# Patient Record
Sex: FEMALE | Race: WHITE | Employment: OTHER | URBAN - METROPOLITAN AREA
[De-identification: names, ages, dates, MRNs, and addresses within clinical notes are randomized per-mention and may not be internally consistent; named-entity substitution may affect disease eponyms.]

---

## 2019-07-27 ENCOUNTER — HOSPITAL ENCOUNTER (OUTPATIENT)
Age: 79
Discharge: HOME | End: 2019-07-27
Attending: EMERGENCY MEDICINE
Payer: MEDICARE

## 2019-07-27 VITALS
BODY MASS INDEX: 27.31 KG/M2 | DIASTOLIC BLOOD PRESSURE: 80 MMHG | WEIGHT: 160 LBS | RESPIRATION RATE: 16 BRPM | SYSTOLIC BLOOD PRESSURE: 138 MMHG | OXYGEN SATURATION: 97 % | HEART RATE: 83 BPM | TEMPERATURE: 98 F | HEIGHT: 64 IN

## 2019-07-27 DIAGNOSIS — S81.802A OPEN WOUND OF LEFT LOWER EXTREMITY, INITIAL ENCOUNTER: Primary | ICD-10-CM

## 2019-07-27 PROCEDURE — 99202 OFFICE O/P NEW SF 15 MIN: CPT

## 2019-07-27 NOTE — ED PROVIDER NOTES
Patient Seen in: [address]    History   Patient presents with:  Cellulitis (integumentary, infectious)    Stated Complaint: left leg problem    HPI  Patient presents as she is concerned about slow healing to a left leg HENT:   Head: Normocephalic and atraumatic. Right Ear: External ear normal.   Left Ear: External ear normal.   Eyes: Conjunctivae and EOM are normal.   Neck: Normal range of motion. Neck supple.    Cardiovascular: Normal rate, regular rhythm, normal heart [address]  [phone]  Call in 2 days          Medications Prescribed:  Discharge Medication List as of 7/27/2019 12:39 PM
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
## 2019-07-27 NOTE — ED NOTES
Wound Care information given to patient for [facility] and [facility].  Patient verbalized understanding to f/u with Dr. Giovanna Julio and with wound care.

## 2023-07-23 ENCOUNTER — HOSPITAL ENCOUNTER (OUTPATIENT)
Age: 83
Discharge: HOME | End: 2023-07-23
Payer: MEDICARE

## 2023-07-23 VITALS
RESPIRATION RATE: 18 BRPM | DIASTOLIC BLOOD PRESSURE: 88 MMHG | TEMPERATURE: 99 F | HEART RATE: 85 BPM | OXYGEN SATURATION: 97 % | SYSTOLIC BLOOD PRESSURE: 137 MMHG

## 2023-07-23 DIAGNOSIS — R31.9 URINARY TRACT INFECTION WITH HEMATURIA, SITE UNSPECIFIED: Primary | ICD-10-CM

## 2023-07-23 DIAGNOSIS — N39.0 URINARY TRACT INFECTION WITH HEMATURIA, SITE UNSPECIFIED: Primary | ICD-10-CM

## 2023-07-23 LAB
BILIRUB UR QL STRIP: NEGATIVE
COLOR UR: YELLOW
GLUCOSE UR STRIP-MCNC: NEGATIVE MG/DL
KETONES UR STRIP-MCNC: NEGATIVE MG/DL
NITRITE UR QL STRIP: NEGATIVE
PH UR STRIP: 6 [PH]
PROT UR STRIP-MCNC: NEGATIVE MG/DL
SP GR UR STRIP: 1.01
UROBILINOGEN UR STRIP-ACNC: <2 MG/DL

## 2023-07-23 PROCEDURE — 99203 OFFICE O/P NEW LOW 30 MIN: CPT | Performed by: NURSE PRACTITIONER

## 2023-07-23 PROCEDURE — 81002 URINALYSIS NONAUTO W/O SCOPE: CPT | Performed by: NURSE PRACTITIONER

## 2023-07-23 RX ORDER — CEPHALEXIN 500 MG/1
500 CAPSULE ORAL 2 TIMES DAILY
Qty: 14 CAPSULE | Refills: 0 | Status: SHIPPED | OUTPATIENT
Start: 2023-07-23 | End: 2023-07-30

## 2023-07-23 NOTE — DISCHARGE INSTRUCTIONS
Start the antibiotic as prescribed. A urine culture is being sent out if it grows a bacteria showing resistance to the antibiotic prescribed you will be notified and the antibiotic will be changed. Drink plenty of fluids and urinate whenever you have the urge if you develop abdominal pain severe back pain fever nausea or vomiting or any new or worsening symptoms go to the nearest emergency department.

## 2023-08-04 ENCOUNTER — HOSPITAL ENCOUNTER (OUTPATIENT)
Age: 83
Discharge: HOME | End: 2023-08-04
Payer: MEDICARE

## 2023-08-04 VITALS
DIASTOLIC BLOOD PRESSURE: 90 MMHG | RESPIRATION RATE: 16 BRPM | SYSTOLIC BLOOD PRESSURE: 149 MMHG | HEART RATE: 89 BPM | OXYGEN SATURATION: 97 % | TEMPERATURE: 98 F

## 2023-08-04 DIAGNOSIS — N39.0 RECURRENT UTI (URINARY TRACT INFECTION): Primary | ICD-10-CM

## 2023-08-04 DIAGNOSIS — R39.9 UTI SYMPTOMS: ICD-10-CM

## 2023-08-04 LAB
CLARITY UR: CLEAR
COLOR UR: YELLOW
GLUCOSE BLDC GLUCOMTR-MCNC: 101 MG/DL (ref 70–99)
GLUCOSE UR STRIP-MCNC: NEGATIVE MG/DL
NITRITE UR QL STRIP: NEGATIVE
PH UR STRIP: 5.5 [PH]
PROT UR STRIP-MCNC: 30 MG/DL
SP GR UR STRIP: 1.02
UROBILINOGEN UR STRIP-ACNC: <2 MG/DL

## 2023-08-04 PROCEDURE — 81002 URINALYSIS NONAUTO W/O SCOPE: CPT | Performed by: PHYSICIAN ASSISTANT

## 2023-08-04 PROCEDURE — 99214 OFFICE O/P EST MOD 30 MIN: CPT | Performed by: PHYSICIAN ASSISTANT

## 2023-08-04 PROCEDURE — 82962 GLUCOSE BLOOD TEST: CPT | Performed by: PHYSICIAN ASSISTANT

## 2023-08-04 RX ORDER — AMOXICILLIN 500 MG/1
500 CAPSULE ORAL 2 TIMES DAILY
Qty: 20 CAPSULE | Refills: 0 | Status: SHIPPED | OUTPATIENT
Start: 2023-08-04 | End: 2023-08-14

## 2023-08-04 NOTE — ED INITIAL ASSESSMENT (HPI)
Patient is here with burning on urination and frequency ever since she was diagnosed with a urinary tract infection.

## 2023-08-07 NOTE — ED QUICK NOTES
Patient called and said she was still having symptoms.   Anna Marie hernandez APN reviewed the chart and the patient was instructed to finish the antibiotic and if still having symptoms to follow up with her MD.

## 2024-08-01 ENCOUNTER — HOSPITAL ENCOUNTER (OUTPATIENT)
Age: 84
Discharge: HOME | End: 2024-08-01
Payer: MEDICARE

## 2024-08-01 VITALS
HEART RATE: 89 BPM | TEMPERATURE: 98 F | SYSTOLIC BLOOD PRESSURE: 132 MMHG | OXYGEN SATURATION: 97 % | RESPIRATION RATE: 18 BRPM | DIASTOLIC BLOOD PRESSURE: 82 MMHG

## 2024-08-01 DIAGNOSIS — R30.0 DYSURIA: ICD-10-CM

## 2024-08-01 DIAGNOSIS — N30.01 ACUTE CYSTITIS WITH HEMATURIA: Primary | ICD-10-CM

## 2024-08-01 LAB
COLOR UR: YELLOW
GLUCOSE UR STRIP-MCNC: NEGATIVE MG/DL
NITRITE UR QL STRIP: NEGATIVE
PH UR STRIP: 6 [PH]
PROT UR STRIP-MCNC: 30 MG/DL
SP GR UR STRIP: 1.02
UROBILINOGEN UR STRIP-ACNC: 2 MG/DL

## 2024-08-01 PROCEDURE — 99214 OFFICE O/P EST MOD 30 MIN: CPT | Performed by: NURSE PRACTITIONER

## 2024-08-01 PROCEDURE — 81002 URINALYSIS NONAUTO W/O SCOPE: CPT | Performed by: NURSE PRACTITIONER

## 2024-08-01 RX ORDER — CEFADROXIL 500 MG/1
500 CAPSULE ORAL 2 TIMES DAILY
Qty: 14 CAPSULE | Refills: 0 | Status: SHIPPED | OUTPATIENT
Start: 2024-08-01 | End: 2024-08-08

## 2024-08-01 NOTE — ED PROVIDER NOTES
Chief Complaint   Patient presents with    Urinary Symptoms       HPI:     Sherley Bower is a 83 year old female with CHF and HTN who presents with a chief complaint of dysuria, urinary frequency, urinary urgency ongoing for 2 weeks.  No fever.  No flank pain or abdominal pain.    PFSH  PFS asessment screens reviewed and agree.  Nursing note reviewed and I agree with documentation.    No family history on file.  Family history reviewed with patient/caregiver and is not pertinent to presenting problem.  Social History     Socioeconomic History    Marital status:      Spouse name: Not on file    Number of children: Not on file    Years of education: Not on file    Highest education level: Not on file   Occupational History    Not on file   Tobacco Use    Smoking status: Never     Passive exposure: Never    Smokeless tobacco: Never   Substance and Sexual Activity    Alcohol use: Not on file    Drug use: Not on file    Sexual activity: Not on file   Other Topics Concern    Not on file   Social History Narrative    Not on file     Social Determinants of Health     Financial Resource Strain: Not on file   Food Insecurity: Not on file   Transportation Needs: Not on file   Physical Activity: Not on file   Stress: Not on file   Social Connections: Not on file   Housing Stability: Not on file           ROS:   Positive for stated complaint: Dysuria  All other systems reviewed and negative except as noted above.  Constitutional and Vital Signs Reviewed.      Physical Exam:     /82   Pulse 89   Temp 98.1 °F (36.7 °C) (Temporal)   Resp 18   SpO2 97%   GENERAL: well developed, well nourished, well hydrated, no distress  LUNGS: clear to auscultation, no RRW  CARDIO: RRR without murmur  BACK: CVA tenderness: Bilaterally, No  GI: soft, non-tender, without masses or organomegaly  EXTREMITIES: no cyanosis or edema. LO without difficulty  SKIN: good skin turgor, no rashes      MDM/Assessment/Plan:   Orders for this  encounter:    Orders Placed This Encounter    POCT Urinalysis Dipstick    Urine Culture, Routine     Order Specific Question:   Specimen source:     Answer:   Urine, clean catch     Order Specific Question:   Documentation of symptoms of UTI or sepsis:     Answer:   Documentation of symptoms of UTI or sepsis must be corroborated within the EMR     Order Specific Question:   Release to patient     Answer:   Immediate    POCT Urine Dip    cefadroxil 500 MG Oral Cap     Sig: Take 1 capsule (500 mg total) by mouth 2 (two) times daily for 7 days.     Dispense:  14 capsule     Refill:  0       Labs performed this visit:  Recent Results (from the past 10 hour(s))   POCT Urinalysis Dipstick    Collection Time: 08/01/24  5:45 PM   Result Value Ref Range    Urine Color Yellow Yellow    Urine Clarity Cloudy (A) Clear    Specific Gravity, Urine 1.020 1.005 - 1.030    PH, Urine 6.0 5.0 - 8.0    Protein urine 30 (A) Negative mg/dL    Glucose, Urine Negative Negative mg/dL    Ketone, Urine Trace (A) Negative mg/dL    Bilirubin, Urine Small (A) Negative    Blood, Urine Moderate (A) Negative    Nitrite Urine Negative Negative    Urobilinogen urine 2.0 (A) <2.0 mg/dL    Leukocyte esterase urine Moderate (A) Negative       MDM:   Medical Decision Making  Differentials: acute cystitis vs pyelonephritis vs obstructive stone vs other     HPI and exam consistent with acute cystitis.  Patient is afebrile, no CVAT on exam, no colicky flank pain, low suspicion for pyelonephritis versus obstructive stone. Will start cefadroxil.  ER precautions were discussed in detail.  Patient was advised to follow-up with primary care provider if no improvement after antibiotic.  Patient verbalized understanding and agreeable to plan of care.       Amount and/or Complexity of Data Reviewed  Labs: ordered. Decision-making details documented in ED Course.     Details: Urine dip- consistent with infection   Urine culture pending     Risk  Prescription drug  management.        Diagnosis:    ICD-10-CM    1. Acute cystitis with hematuria  N30.01       2. Dysuria  R30.0 Urine Culture, Routine     Urine Culture, Routine          All results reviewed and discussed with patient.  See AVS for detailed discharge instructions for your condition today.    Follow Up with:  No follow-up provider specified.

## 2024-08-01 NOTE — DISCHARGE INSTRUCTIONS
Cefadroxil 1 tablet twice per day for 7 days  Please finish your medication as prescribed  Drink plenty of fluids, water and 100% cranberry juice  Please go to the emergency room if you develop fever, sweats, chills, abdominal pain, pelvic pain or back pain  Please see your primary care provider if no better after antibiotics

## 2024-10-04 ENCOUNTER — HOSPITAL ENCOUNTER (OUTPATIENT)
Age: 84
Discharge: HOME | End: 2024-10-04
Payer: MEDICARE

## 2024-10-04 VITALS
HEART RATE: 89 BPM | OXYGEN SATURATION: 98 % | DIASTOLIC BLOOD PRESSURE: 86 MMHG | RESPIRATION RATE: 16 BRPM | TEMPERATURE: 98 F | SYSTOLIC BLOOD PRESSURE: 149 MMHG

## 2024-10-04 DIAGNOSIS — N39.0 URINARY TRACT INFECTION WITHOUT HEMATURIA, SITE UNSPECIFIED: Primary | ICD-10-CM

## 2024-10-04 LAB
BILIRUB UR QL STRIP: NEGATIVE
GLUCOSE UR STRIP-MCNC: NEGATIVE MG/DL
NITRITE UR QL STRIP: POSITIVE
PH UR STRIP: 6 [PH]
PROT UR STRIP-MCNC: 100 MG/DL
SP GR UR STRIP: 1.02
UROBILINOGEN UR STRIP-ACNC: <2 MG/DL

## 2024-10-04 PROCEDURE — 99214 OFFICE O/P EST MOD 30 MIN: CPT | Performed by: NURSE PRACTITIONER

## 2024-10-04 PROCEDURE — 81002 URINALYSIS NONAUTO W/O SCOPE: CPT | Performed by: NURSE PRACTITIONER

## 2024-10-04 RX ORDER — CEPHALEXIN 500 MG/1
500 CAPSULE ORAL 2 TIMES DAILY
Qty: 20 CAPSULE | Refills: 0 | Status: SHIPPED | OUTPATIENT
Start: 2024-10-04 | End: 2024-10-14

## 2024-10-04 NOTE — ED PROVIDER NOTES
Patient Seen in: [facility]      History     Chief Complaint   Patient presents with    Urinary Symptoms            Stated Complaint: Urinary Symptoms    Subjective:   HPI    84-year-old female with history of frequent UTIs presents today with complaints of dysuria urinary frequency and fatigue.  She states she has had the symptoms on and off for a couple of months.  She has been afebrile.  There is been no hematuria.  She does have a urologist.    Objective:     Past Medical History:    A-fib (HCC)    Diabetes (HCC)    Essential hypertension              History reviewed. No pertinent surgical history.             Social History     Socioeconomic History    Marital status:    Tobacco Use    Smoking status: Never     Passive exposure: Never    Smokeless tobacco: Never   Substance and Sexual Activity    Alcohol use: Yes     Comment: occ              Review of Systems    Positive for stated complaint: Urinary Symptoms  Other systems are as noted in HPI.  Constitutional and vital signs reviewed.      All other systems reviewed and negative except as noted above.    Physical Exam     ED Triage Vitals [10/04/24 1348]   /86   Pulse 89   Resp 16   Temp 97.6 °F (36.4 °C)   Temp src Temporal   SpO2 98 %   O2 Device None (Room air)       Current Vitals:   Vital Signs  BP: 149/86  Pulse: 89  Resp: 16  Temp: 97.6 °F (36.4 °C)  Temp src: Temporal    Oxygen Therapy  SpO2: 98 %  O2 Device: None (Room air)        Physical Exam  Vitals reviewed.   Constitutional:       General: She is not in acute distress.     Appearance: She is not ill-appearing.   HENT:      Nose: Nose normal.      Mouth/Throat:      Mouth: Mucous membranes are moist.   Cardiovascular:      Rate and Rhythm: Normal rate and regular rhythm.   Pulmonary:      Effort: Pulmonary effort is normal.      Breath sounds: Normal breath sounds.   Abdominal:      Palpations: Abdomen is soft.      Tenderness: There is no abdominal tenderness. There  is no right CVA tenderness or left CVA tenderness.   Musculoskeletal:         General: Normal range of motion.   Skin:     General: Skin is warm and dry.   Neurological:      General: No focal deficit present.      Mental Status: She is alert and oriented to person, place, and time.   Psychiatric:         Mood and Affect: Mood normal.         Behavior: Behavior normal.             ED Course     Labs Reviewed   [facility] POCT URINALYSIS DIPSTICK - Abnormal; Notable for the following components:       Result Value    Urine Color Alyse (*)     Urine Clarity Cloudy (*)     Protein urine 100 (*)     Ketone, Urine Trace (*)     Blood, Urine Moderate (*)     Nitrite Urine Positive (*)     Leukocyte esterase urine Large (*)     All other components within normal limits   URINE CULTURE, ROUTINE                   MDM                          Medical Decision Making  84-year-old female presents to immediate care with complaints of dysuria and urinary frequency.  Differential diagnosis includes UTI, pyelonephritis, kidney stone.  Patient has history of frequent UTIs.  She is followed by a urologist.  She states she has had the symptoms on and off for a couple of months.  Urine specimen was collected and shows positive UTI.  Will send for culture.  Results were discussed with patient.  She was given a prescription for Keflex and instructions to follow-up with her urologist.    Amount and/or Complexity of Data Reviewed  Labs: ordered.     Details: POC urine shows large leuks, positive nitrites, moderate blood.      Risk  OTC drugs.  Prescription drug management.        Disposition and Plan     Clinical Impression:  1. Urinary tract infection without hematuria, site unspecified         Disposition:  Discharge  10/4/2024  2:14 pm    Follow-up:  No follow-up provider specified.        Medications Prescribed:  Discharge Medication List as of 10/4/2024  2:14 PM        START taking these medications    Details   cephALEXin 500 MG Oral Cap  Take 1 capsule (500 mg total) by mouth 2 (two) times daily for 10 days., Normal, Disp-20 capsule, R-0                 Supplementary Documentation:

## 2024-10-04 NOTE — ED INITIAL ASSESSMENT (HPI)
Dysuria,  cloudy urine, malodorous urine, , fatigue, urinary frequency. Denies fevers. Sx have been present intermittently past couple of months only while taking hiprex.

## 2025-04-05 ENCOUNTER — APPOINTMENT (OUTPATIENT)
Dept: GENERAL RADIOLOGY | Age: 85
End: 2025-04-05
Payer: MEDICARE

## 2025-04-05 ENCOUNTER — HOSPITAL ENCOUNTER (OUTPATIENT)
Age: 85
Discharge: HOME | End: 2025-04-05
Payer: MEDICARE

## 2025-04-05 VITALS
DIASTOLIC BLOOD PRESSURE: 68 MMHG | OXYGEN SATURATION: 100 % | TEMPERATURE: 98 F | RESPIRATION RATE: 20 BRPM | HEART RATE: 91 BPM | SYSTOLIC BLOOD PRESSURE: 108 MMHG

## 2025-04-05 DIAGNOSIS — M79.643 HAND PAIN: ICD-10-CM

## 2025-04-05 DIAGNOSIS — S63.501A SPRAIN OF RIGHT WRIST, INITIAL ENCOUNTER: Primary | ICD-10-CM

## 2025-04-05 DIAGNOSIS — M25.539 WRIST PAIN: ICD-10-CM

## 2025-04-05 PROCEDURE — 99213 OFFICE O/P EST LOW 20 MIN: CPT

## 2025-04-05 PROCEDURE — L3908 WHO COCK-UP NONMOLDE PRE OTS: HCPCS

## 2025-04-05 PROCEDURE — 73110 X-RAY EXAM OF WRIST: CPT

## 2025-04-05 PROCEDURE — 73130 X-RAY EXAM OF HAND: CPT

## 2025-04-05 NOTE — ED PROVIDER NOTES
Patient Seen in: [facility]      History     Chief Complaint   Patient presents with    Wrist Pain     Stated Complaint: Wrist Pain  Subjective:   Sherley is an 84-year-old female presenting to the immediate care following a mechanical fall yesterday.  Patient states that she was walking up some steps yesterday and missed a step causing herself to fall onto her right side.  Patient has had bruising, swelling and pain to the right wrist ever since the fall.  Patient states that she does not have any numbness or tingling to her fingers.  Denies any pain to the forearm or elbow.  Patient is right-hand dominant.  Patient states she did not hit her head.  There is no loss of consciousness.  She denies any head, neck or back pain.  Patient states that she is otherwise feeling well.  She denies any other concerns or complaints.        Objective:   Past Medical History:    A-fib (HCC)    Diabetes (HCC)    Essential hypertension            History reviewed. No pertinent surgical history.           Social History     Socioeconomic History    Marital status:    Tobacco Use    Smoking status: Never     Passive exposure: Never    Smokeless tobacco: Never   Substance and Sexual Activity    Alcohol use: Yes     Comment: occ     Social Drivers of Health     Food Insecurity: No Food Insecurity (3/4/2025)    Received from [facility]    Hunger Vital Sign     Worried About Running Out of Food in the Last Year: Never true     Ran Out of Food in the Last Year: Never true   Transportation Needs: No Transportation Needs (3/4/2025)    Received from [facility]    PRAPARE - Transportation     Lack of Transportation (Medical): No     Lack of Transportation (Non-Medical): No   Housing Stability: Unknown (3/4/2025)    Received from [facility]    Housing Stability Vital Sign     Unable to Pay for Housing in the Last Year: No     Homeless in the Last Year: No             Review of Systems    Positive for stated complaint: Wrist Pain    Other systems are as noted in HPI.  Constitutional and vital signs reviewed.      All other systems reviewed and negative except as noted above.    Physical Exam     ED Triage Vitals   BP 04/05/25 1202 108/68   Pulse 04/05/25 1201 91   Resp 04/05/25 1201 20   Temp 04/05/25 1201 98.2 °F (36.8 °C)   Temp src 04/05/25 1201 Oral   SpO2 04/05/25 1201 100 %   O2 Device 04/05/25 1201 None (Room air)     Current:/68   Pulse 91   Temp 98.2 °F (36.8 °C) (Oral)   Resp 20   SpO2 100%     Physical Exam  Vitals and nursing note reviewed.   Constitutional:       General: She is not in acute distress.     Appearance: Normal appearance. She is not ill-appearing, toxic-appearing or diaphoretic.   HENT:      Head: Normocephalic.   Cardiovascular:      Rate and Rhythm: Normal rate.   Pulmonary:      Effort: Pulmonary effort is normal.   Musculoskeletal:         General: Normal range of motion.      Right forearm: Normal.      Left forearm: Normal.      Right wrist: Swelling, tenderness and bony tenderness present. No deformity, effusion, lacerations, snuff box tenderness or crepitus. Normal range of motion. Normal pulse.      Right hand: Swelling, tenderness and bony tenderness present. No deformity or lacerations. Normal range of motion. Normal strength. Normal sensation. Normal capillary refill. Normal pulse.      Left hand: Normal.      Cervical back: Normal range of motion.      Comments: Positive CMS.  2+ radial and ulnar pulses. Capillary refill less than 2 seconds.  Patient does have full range of motion to the right wrist.  Patient has tenderness to the right wrist and lateral right hand with palpation.  There is swelling and ecchymosis noted to the entire right wrist over the ulnar aspect.  No warmth, erythema, abrasions, lacerations or bleeding noted. No obvious deformity or crepitus.  Patient has full range of motion to the right hand and  all 5 fingers.  The right forearm, elbow, humerus, shoulder and clavicle are unremarkable.       Skin:     General: Skin is warm and dry.      Capillary Refill: Capillary refill takes less than 2 seconds.   Neurological:      General: No focal deficit present.      Mental Status: She is alert and oriented to person, place, and time.   Psychiatric:         Mood and Affect: Mood normal.         Behavior: Behavior normal.         Thought Content: Thought content normal.         Judgment: Judgment normal.         ED Course   Radiology:    XR WRIST COMPLETE (MIN 3 VIEWS), RIGHT (CPT=73110)   Final Result   PROCEDURE: XR WRIST COMPLETE (MIN 3 VIEWS), RIGHT (CPT=73110)       COMPARISON: None.       INDICATIONS: Pain, redness, and swelling along ulnar aspect of right wrist    post fall 1 day ago.       Findings and impression:       Normal alignment.  No fracture.       Moderate DJD of the DRUJ.  Mild narrowing and calcification in the    radiocarpal joint.  SL interval measures 5 millimeter.  Overlying dorsal    wrist swelling and calcification.  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       INDICATIONS: Pain, swelling, and bruising surrounding right 4th and 5th    metacarpals post fall 1 day ago.       Findings and impression:       Normal alignment with no fracture        Severe narrowing 1st through 3rd MCP joints   Finalized by (CST): King Pulido MD on 4/05/2025 at 12:40 PM                          Labs Reviewed - No data to display    MDM     Medical Decision Making  Differential diagnoses reflecting the complexity of care include but are not limited to bony versus  soft tissue injury to the right hand and wrist.    Comorbidities that add complexity to management include: None  History obtained by an independent source was from: Patient  My independent interpretations of studies include: X-ray  Patient is well appearing, non-toxic and in no acute distress.  Vital signs are stable.     There is no fracture on x-ray per the radiology read.  Patient's history and physical exam are consistent with a sprain.    Patient has a significant amount of degenerative disease her x-ray.  I did call the radiologist and discussed a questionable finding on the lateral view of the right wrist.  He states that he believes this is an old fracture or severe arthritis.  We reviewed the films together and agree there are no acute fractures.  Full wrist splint placed for comfort.  Recommended RICE.  Recommended using Tylenol and Motrin for pain.  Recommended that if the patient develop any numbness, tingling, acutely worsening pain, swelling or any other concerns or complaints they go to the emergency department.  Recommended that if patient has persistent pain they make an appointment to follow-up with the orthopedic specialist.  Otherwise recommended follow-up with primary care doctor.    ED precautions discussed.  Patient (guardian) advised to follow up with PCP in 2-3 days.  Patient (guardian) agrees with this plan of care.  Patient (guardian) verbalizes understanding of discharge instructions and plan of care.      Amount and/or Complexity of Data Reviewed  Radiology: ordered and independent interpretation performed. Decision-making details documented in ED Course.    Risk  OTC drugs.        Disposition and Plan     Clinical Impression:  1. Sprain of right wrist, initial encounter    2. Hand pain    3. Wrist pain         Disposition:  Discharge  4/5/2025  1:12 pm    Follow-up:  Jackson Martinez MD  [address]  [place] [address]  [phone]          Massimo Roe MD  [address] [place] [address]  [facility]  [address] [address]  [phone]                Medications Prescribed:  Discharge Medication List as of 4/5/2025  1:12 PM

## 2025-04-05 NOTE — ED INITIAL ASSESSMENT (HPI)
Patient reports yesterday falling up the stairs and landing on right wrist. Patient has pain in right wrist that radiates to hand with swelling and bruising noted. Patient denies hitting head and does currently take a blood thinner.

## 2025-04-05 NOTE — DISCHARGE INSTRUCTIONS
There is no fracture on your x-ray.  You likely have a sprain of your wrist.  Wear the splint for comfort.  Rest, ice and elevate.  Take Tylenol and Motrin for pain as needed.  If you develop any numbness, tingling, acutely worsening pain, swelling or any other concerning complaints you should go to the emergency department.  If you have persistent pain for more than the next week make an appointment to see the orthopedic specialist.  Otherwise follow-up with your primary care doctor.

## 2025-08-11 ENCOUNTER — HOSPITAL ENCOUNTER (OUTPATIENT)
Age: 85
Discharge: ANOTHER HOSPITAL | End: 2025-08-11

## 2025-08-11 ENCOUNTER — HOSPITAL ENCOUNTER (EMERGENCY)
Facility: HOSPITAL | Age: 85
Discharge: HOME | End: 2025-08-11
Attending: EMERGENCY MEDICINE

## 2025-08-11 VITALS
SYSTOLIC BLOOD PRESSURE: 171 MMHG | OXYGEN SATURATION: 96 % | DIASTOLIC BLOOD PRESSURE: 105 MMHG | HEART RATE: 60 BPM | TEMPERATURE: 98 F | RESPIRATION RATE: 14 BRPM

## 2025-08-11 VITALS
RESPIRATION RATE: 18 BRPM | HEART RATE: 85 BPM | SYSTOLIC BLOOD PRESSURE: 160 MMHG | OXYGEN SATURATION: 100 % | DIASTOLIC BLOOD PRESSURE: 110 MMHG | TEMPERATURE: 98 F

## 2025-08-11 DIAGNOSIS — M54.2 NECK PAIN ON LEFT SIDE: ICD-10-CM

## 2025-08-11 DIAGNOSIS — I10 ASYMPTOMATIC HYPERTENSION: Primary | ICD-10-CM

## 2025-08-11 DIAGNOSIS — I10 UNCONTROLLED HYPERTENSION: Primary | ICD-10-CM

## 2025-08-11 PROCEDURE — 93010 ELECTROCARDIOGRAM REPORT: CPT

## 2025-08-11 PROCEDURE — 93000 ELECTROCARDIOGRAM COMPLETE: CPT | Performed by: PHYSICIAN ASSISTANT

## 2025-08-11 PROCEDURE — 99283 EMERGENCY DEPT VISIT LOW MDM: CPT

## 2025-08-11 PROCEDURE — 99213 OFFICE O/P EST LOW 20 MIN: CPT | Performed by: PHYSICIAN ASSISTANT

## 2025-08-11 PROCEDURE — 93005 ELECTROCARDIOGRAM TRACING: CPT

## 2025-08-12 LAB
ATRIAL RATE: 468 BPM
ATRIAL RATE: 68 BPM
Q-T INTERVAL: 444 MS
Q-T INTERVAL: 464 MS
QRS DURATION: 152 MS
QRS DURATION: 152 MS
QTC CALCULATION (BEZET): 486 MS
QTC CALCULATION (BEZET): 492 MS
R AXIS: -76 DEGREES
R AXIS: -80 DEGREES
T AXIS: 73 DEGREES
T AXIS: 77 DEGREES
VENTRICULAR RATE: 66 BPM
VENTRICULAR RATE: 74 BPM